# Patient Record
Sex: FEMALE | Race: WHITE
[De-identification: names, ages, dates, MRNs, and addresses within clinical notes are randomized per-mention and may not be internally consistent; named-entity substitution may affect disease eponyms.]

---

## 2017-03-13 ENCOUNTER — HOSPITAL ENCOUNTER (EMERGENCY)
Dept: HOSPITAL 80 - FED | Age: 32
LOS: 1 days | Discharge: HOME | End: 2017-03-14
Payer: MEDICAID

## 2017-03-13 DIAGNOSIS — Z91.040: ICD-10-CM

## 2017-03-13 DIAGNOSIS — J45.901: Primary | ICD-10-CM

## 2017-03-13 DIAGNOSIS — J06.9: ICD-10-CM

## 2017-03-13 NOTE — EDPHY
H & P


Stated Complaint: FEVER SEVERE COUGH SINCE SAT NIGHT


Time Seen by Provider: 17 23:04


HPI/ROS: 





HPI


The patient presents with Cough for the last 3 days which is worse at night, 

though constant, dry without any sputum production, not associated with any 

rhinorrhea or sore throat.  She is not sure if she had a fever.  She denies any 

known sick contacts.  She does have a history of asthma and her albuterol as 

currently .  She does not feel it is helping her with her symptoms.  She 

denies any hemoptysis.





REVIEW OF SYSTEMS


Constitutional:  No fever, no chills.


Eyes:  No discharge.


ENT:  No sore throat.


Cardiovascular:  No chest pain, no palpitations.


Respiratory:   see HPI


Gastrointestinal:  No abdominal pain, no vomiting.


Genitourinary:  No hematuria.


Musculoskeletal:  No back pain.


Skin:  No rashes.


Neurological:  No headache.





PMHx: asthma





Soc Hx: nonsmoker








PHYSICAL


General Appearance: Alert, no distress


Eyes: Pupils equal and round no pallor or injection


ENT, Mouth: Mucous membranes moist


Respiratory: occasional dry cough, breathing comfortably,There are no 

retractions, lungs are clear to auscultation


Cardiovascular: Regular rate and rhythm 


Gastrointestinal: Abdomen is soft and non-tender, no masses, bowel sounds 

normal 


Neurological: A&O, moves all extremities


Skin: Warm and dry, no rashes


Musculoskeletal: Neck is supple non tender 


Extremities:  symmetrical, full range of motion 


Psychiatric: Patient is oriented X 3, there is no agitation 





Source: Patient


Exam Limitations: No limitations





- Personal History


Current Tetanus/Diphtheria Vaccine: Yes


Current Tetanus Diphtheria and Acellular Pertussis (TDAP): Yes


Tetanus Vaccine Date: <10 YRS





- Medical/Surgical History


Hx Asthma: Yes


Hx Chronic Respiratory Disease: No


Hx Diabetes: No


Hx Cardiac Disease: No


Hx Renal Disease: Yes


Hx Cirrhosis: No


Hx Alcoholism: No


Hx HIV/AIDS: No


Hx Splenectomy or Spleen Trauma: No


Other PMH: concussions, wrist surgery, elbow surgery, shoulder surgery, kidney 

stones, R knee sx, asthma, uti's, "horseshoe kidney"





- Social History


Smoking Status: Never smoked


Constitutional: 


 Initial Vital Signs











Temperature (C)  37.3 C   17 22:20


 


Heart Rate  112 H  17 22:20


 


Respiratory Rate  18   17 22:20


 


Blood Pressure  117/90 H  17 22:20


 


O2 Sat (%)  97   17 22:20








 











O2 Delivery Mode               Room Air














Allergies/Adverse Reactions: 


 





latex [Latex] Allergy (Verified 17 22:22)


 Other-Enter Comments


milk Allergy (Verified 17 22:22)


 








Home Medications: 














 Medication  Instructions  Recorded


 


Citalopram [CeleXA 20 MG] 20 mg PO HS 13


 


Ibuprofen [Motrin (*)] 800 mg PO Q6 #15 tab 14


 


Albuterol [Proventil] 17 gm IH Q2H PRN #2 aerosol 17


 


Hydrocodone/Acetaminophen 1 each PO 17





[Hydrocodon-Acetaminophen 5-325]  


 


Oxybutynin Chloride [Ditropan 5mg 5 mg PO 17





(RX)]  


 


Sulfamethox/Tmp 800/160 mg 1 tab PO 17





[Bactrim Ds]  


 


predniSONE [Prednisone] 40 mg PO DAILY #16 tablet 17














Medical Decision Making





- Diagnostics


Imaging: 





 chest x-ray two views shows no infiltrate, mild airways disease, reviewed by 

Dr. Patten images were reviewed by me on the PAC system.


Differential Diagnosis: 





  This is a 31-year-old female with asthma who presents with several days of 

cough with  possible subjective fever.  On exam, she is not hypoxic and 

generally well appearing.  She has received a DuoNeb prior to my assessment 

which has helped her symptoms.





Differential diagnosis includes viral URI, pneumonia, asthma exacerbation.





The patient was given a dose of prednisone, I feel she is likely suffering from 

a viral URI with superimposed asthma exacerbation which is mild.  I will refill 

her albuterol for her and I have advised her to follow up with her regular 

doctor in 2 days if she is not feeling better.  She is in agreement with this 

plan.





- Data Points


Medications Given: 


 








Discontinued Medications





Albuterol/Ipratropium (Duoneb)  3 ml IH EDNOW ONE


   Stop: 17 23:07


   Last Admin: 17 23:13 Dose:  3 ml


Prednisone (Prednisone)  60 mg PO EDNOW ONE


   Stop: 17 23:36


   Last Admin: 17 00:23 Dose:  60 mg








Departure





- Departure


Disposition: Home, Routine, Self-Care


Clinical Impression: 


 Exacerbation of asthma





Upper respiratory tract infection


Qualifiers:


 URI type: unspecified viral URI Qualified Code(s): J06.9 - Acute upper 

respiratory infection, unspecified





Condition: Good


Instructions:  Upper Respiratory Infection (ED)


Additional Instructions: 


Please take your albuterol every 2-4 hours as needed for cough and wheezing.  

You should take the prednisone for the next 4 days.  You should return to the 

emergency room if you're worse in any way.  Otherwise, please follow-up with 

your primary care doctor in 2 days.


Referrals: 


Tammie Ayoub MD [Primary Care Provider] - As per Instructions


Prescriptions: 


Albuterol [Proventil] 17 gm IH Q2H PRN #2 aerosol


 PRN Reason: cough


predniSONE [Prednisone] 40 mg PO DAILY #16 tablet

## 2017-03-14 VITALS
DIASTOLIC BLOOD PRESSURE: 87 MMHG | OXYGEN SATURATION: 94 % | HEART RATE: 82 BPM | TEMPERATURE: 98.8 F | RESPIRATION RATE: 16 BRPM | SYSTOLIC BLOOD PRESSURE: 116 MMHG

## 2017-12-15 ENCOUNTER — HOSPITAL ENCOUNTER (OUTPATIENT)
Dept: HOSPITAL 80 - GIMAGING | Age: 32
End: 2017-12-15
Attending: FAMILY MEDICINE
Payer: MEDICAID

## 2017-12-15 DIAGNOSIS — R05: Primary | ICD-10-CM

## 2018-02-02 ENCOUNTER — HOSPITAL ENCOUNTER (EMERGENCY)
Dept: HOSPITAL 80 - FED | Age: 33
End: 2018-02-02
Payer: MEDICAID

## 2018-02-02 VITALS
SYSTOLIC BLOOD PRESSURE: 88 MMHG | OXYGEN SATURATION: 82 % | DIASTOLIC BLOOD PRESSURE: 58 MMHG | RESPIRATION RATE: 35 BRPM | HEART RATE: 115 BPM

## 2018-02-02 DIAGNOSIS — I46.9: Primary | ICD-10-CM

## 2018-02-02 DIAGNOSIS — J45.909: ICD-10-CM

## 2018-02-02 LAB
INR PPP: 1.24 (ref 0.83–1.16)
PLATELET # BLD: 150 10^3/UL (ref 150–400)
PROTHROMBIN TIME: 15.8 SEC (ref 12–15)

## 2018-02-02 PROCEDURE — 0BH17EZ INSERTION OF ENDOTRACHEAL AIRWAY INTO TRACHEA, VIA NATURAL OR ARTIFICIAL OPENING: ICD-10-PCS | Performed by: EMERGENCY MEDICINE

## 2018-02-02 PROCEDURE — G0480 DRUG TEST DEF 1-7 CLASSES: HCPCS

## 2018-02-02 NOTE — EDPHY
H & P


Stated Complaint: Code


HPI/ROS: 





HPI





CHIEF COMPLAINT:  Cardiopulmonary arrest





HISTORY OF PRESENT ILLNESS:  This patient is a 32-year-old female, history of 

asthma, presents to the emergency room by EMS as a cardiopulmonary arrest, with 

initial ROSC.  EMS was called to her residence for shortness of breath, nausea, 

and vomiting and diarrhea when they arrived they found her on the floor 

vomiting and she was complaining of shortness of breath. They put her on the 

stretcher and started a DuoNeb and transported her to the truck to place her on 

CPAP as she appeared to be in significant respiratory distress. When 

transporting her into the ambulance she apparently went to an agonal respiration

, went from a tachycardic rhythm down into a bradycardic rhythm and lost her 

pulse.  


They immediately started CPR.  They gave 1 mg epinephrine, also gave her 1 mg 

Narcan.  They transported the patient emergently to [facility] 

Emergency room, to ER room 1, where I greeted her upon arrival.


When she arrived to the emergency room she was in a sinus tachycardia rhythm, 

she was being bagged, however shortly after arrival she Miguelangel down into the 30s 

lost her pulse and we started CPR again.


She was intubated emergently upon arrival by myself.





After 35 min of ACLS protocol CPR, multiple rounds of epinephrine, multiple 

rounds of bicarb, and multiple rounds of calcium the patient had no return of 

spontaneous circulation she was in PEA throughout the rest of the 35 mins.  She 

had dilated 8 mm equal pupils.  During the 35 minutes of resuscitation, her 

mother did show up.  After 35 min of ACLS protocol, this was unfortunately 

unsuccessful, she did not return to spontaneous circulation and the decision 

was made to no longer continue CPR and resuscitation efforts.  A bedside 

ultrasound was performed.  There was no cardiac activity seen.





Past Medical History:  Anxiety, asthma





Past Surgical History:  Unknown surgical history





Social History:  Unknown drugs alcohol or tobacco





Family History:  Unknown








ROS   


REVIEW OF SYSTEMS:


 limited due the patient's clinical state.








Exam   


Constitutional  unresponsive. 


Eyes  8 mm dilated equal pupils not reactive to light


HENT   atraumatic exam


Respiratory   bagged, clear breath sounds with bagging


Cardiovascular   PEA rhythm no pulses.


Gastrointestinal   distended abdomen


Musculoskeletal  no obvious signs of trauma.


Skin   cool extremities, pale skin


Neurologic  unresponsive





Differential Diagnosis:  Includes but is not limited to in a particular order 

cardiopulmonary arrest, respiratory distress leading to cardiopulmonary arrest, 

electrolyte disturbance, sepsis, acute MI, other cardiac arrhythmia





Medical Decision Making:  Plan for this patient she is immediately greeted upon 

arrival to the emergency room where she was emergently intubated by myself with 

glide scope a 7.0 endotracheal tube was directly placed through the cords.  

Upon arrival the patient Miguelangel down into a PA bradycardic rhythm.  Patient 

immediately had CPR and multiple rounds of ACLS protocol.  Patient was given 

multiple rounds of epinephrine, bicarb, calcium.  She had 35 min of 

resuscitative efforts here in the emergency room at approximately 10-15 minutes 

of resuscitative efforts prior to arriving.


Unfortunately the patient did not return to spontaneous circulation.





The mom arrived we made a decision to no longer pursue resuscitative efforts.





Critical Care:  Total Critical Care Time Spent Managing this Patient: 35Minutes.


This time was spent Exclusively with this patient.


This Care was exclusive of procedures.


The Organ System/life at risk was cardiopulmonary arrest, respiratory


 This Patient was in Critical Condition because cardiopulmonary arrest





ED intubation Procedure:


Upon arrival to ER room 2.  The patient was emergently intubated by myself.  

Direct view of the cords were visualized by the glide scope.  A 7.0 

endotracheal tube was placed.  I saw the tube go directly through the cords.  

There is humidified return air in the tube.  Good capnography for change.  

Bilateral breath sounds.





35 min of ACLS protocol with multiple rounds of epinephrine, bicarb, calcium 

and multiple rounds of continuous CPR was performed.  This was unsuccessful.


Patient  time of death 0543AM.  Mother updated at bedside.

















        











Source: Patient, EMS





- Personal History


Current Tetanus/Diphtheria Vaccine: Unsure


Current Tetanus Diphtheria and Acellular Pertussis (TDAP): Unsure


Tetanus Vaccine Date: <10 YRS





- Medical/Surgical History


Hx Asthma: Yes


Hx Chronic Respiratory Disease: No


Hx Diabetes: No


Hx Cardiac Disease: No


Hx Renal Disease: Yes


Hx Cirrhosis: No


Hx Alcoholism: No


Hx HIV/AIDS: No


Hx Splenectomy or Spleen Trauma: No


Other PMH: concussions, wrist surgery, elbow surgery, shoulder surgery, kidney 

stones, R knee sx, asthma, uti's, "horseshoe kidney"





- Social History


Smoking Status: Never smoked


Constitutional: 


 Initial Vital Signs











Heart Rate  70   18 05:10


 


Respiratory Rate  10 L  18 05:10


 


O2 Sat (%)  78 L  18 05:10








 











O2 Delivery Mode               Bag Valve Mask














Allergies/Adverse Reactions: 


 





latex [Latex] Allergy (Verified 18 05:48)


 Other-Enter Comments


milk Allergy (Verified 18 05:48)


 








Home Medications: 














 Medication  Instructions  Recorded


 


Citalopram [CeleXA 20 MG] 20 mg PO HS 13


 


Ibuprofen [Motrin (*)] 800 mg PO Q6 #15 tab 14


 


Albuterol [Proventil] 17 gm IH Q2H PRN #2 aerosol 17


 


Hydrocodone/Acetaminophen 1 each PO 17





[Hydrocodon-Acetaminophen 5-325]  


 


Oxybutynin Chloride [Ditropan 5mg 5 mg PO 17





(RX)]  


 


Sulfamethox/Tmp 800/160 mg 1 tab PO 17





[Bactrim Ds]  


 


predniSONE [Prednisone] 40 mg PO DAILY #16 tablet 17














Medical Decision Making





- Data Points


Laboratory Results: 


 Laboratory Results





 18 05:21 





 18 05:21 





 











  18





  05:21 05:21 05:21


 


WBC      





    


 


RBC      





    


 


Hgb      





    


 


POC Hgb      





    


 


Hct      





    


 


POC Hct      





    


 


MCV      





    


 


MCH      





    


 


MCHC      





    


 


RDW      





    


 


Plt Count      





    


 


MPV      





    


 


Neut % (Auto)      





    


 


Lymph % (Auto)      





    


 


Mono % (Auto)      





    


 


Eos % (Auto)      





    


 


Baso % (Auto)      





    


 


Nucleat RBC Rel Count      





    


 


Absolute Neuts (auto)      





    


 


Absolute Lymphs (auto)      





    


 


Absolute Monos (auto)      





    


 


Absolute Eos (auto)      





    


 


Absolute Basos (auto)      





    


 


Absolute Nucleated RBC      





    


 


Immature Gran %      





    


 


Immature Gran #      





    


 


PT      15.8 SEC H SEC





     (12.0-15.0) 


 


INR      1.24  H 





     (0.83-1.16) 


 


APTT      23.1 SEC SEC





     (23.0-38.0) 


 


POC Sodium      





    


 


Sodium    147 mEq/L H mEq/L  





    (135-145)  


 


POC Potassium      





    


 


Potassium    3.5 mEq/L mEq/L  





    (3.5-5.2)  


 


POC Chloride      





    


 


Chloride    105 mEq/L mEq/L  





    ()  


 


Carbon Dioxide    12 mEq/l L mEq/l  





    (22-31)  


 


Anion Gap    30 mEq/L H mEq/L  





    (8-16)  


 


POC BUN      





    


 


BUN    10 mg/dL mg/dL  





    (7-23)  


 


Creatinine    1.4 mg/dL H mg/dL  





    (0.6-1.0)  


 


POC Creatinine      





    


 


Estimated GFR    44   





    


 


Glucose    285 mg/dL H mg/dL  





    ()  


 


POC Glucose      





    


 


Calcium    9.0 mg/dL mg/dL  





    (8.5-10.4)  


 


Troponin I    0.057 ng/mL H ng/mL  





    (0.000-0.034)  


 


Beta HCG, Qual  NEGATIVE     





    


 


Ethyl Alcohol    < 10 mg/dL mg/dL  





    (0-10)  














  18





  05:21 05:14


 


WBC  17.32 10^3/uL H 10^3/uL  





   (3.80-9.50)  


 


RBC  4.06 10^6/uL L 10^6/uL  





   (4.18-5.33)  


 


Hgb  13.0 g/dL g/dL  





   (12.6-16.3)  


 


POC Hgb    14.3 gm/dL gm/dL





    (12.6-16.3) 


 


Hct  42.5 % %  





   (38.0-47.0)  


 


POC Hct    42 % %





    (38-47) 


 


MCV  104.7 fL H fL  





   (81.5-99.8)  


 


MCH  32.0 pg pg  





   (27.9-34.1)  


 


MCHC  30.6 g/dL L g/dL  





   (32.4-36.7)  


 


RDW  13.2 % %  





   (11.5-15.2)  


 


Plt Count  150 10^3/uL 10^3/uL  





   (150-400)  


 


MPV  10.9 fL fL  





   (8.7-11.7)  


 


Neut % (Auto)  33.0 % L %  





   (39.3-74.2)  


 


Lymph % (Auto)  56.3 % H %  





   (15.0-45.0)  


 


Mono % (Auto)  8.5 % %  





   (4.5-13.0)  


 


Eos % (Auto)  0.8 % %  





   (0.6-7.6)  


 


Baso % (Auto)  0.4 % %  





   (0.3-1.7)  


 


Nucleat RBC Rel Count  0.2 % %  





   (0.0-0.2)  


 


Absolute Neuts (auto)  5.72 10^3/uL 10^3/uL  





   (1.70-6.50)  


 


Absolute Lymphs (auto)  9.75 10^3/uL H 10^3/uL  





   (1.00-3.00)  


 


Absolute Monos (auto)  1.47 10^3/uL H 10^3/uL  





   (0.30-0.80)  


 


Absolute Eos (auto)  0.14 10^3/uL 10^3/uL  





   (0.03-0.40)  


 


Absolute Basos (auto)  0.07 10^3/uL 10^3/uL  





   (0.02-0.10)  


 


Absolute Nucleated RBC  0.04 10^3/uL H 10^3/uL  





   (0-0.01)  


 


Immature Gran %  1.0 % %  





   (0.0-1.1)  


 


Immature Gran #  0.17 10^3/uL H 10^3/uL  





   (0.00-0.10)  


 


PT    





   


 


INR    





   


 


APTT    





   


 


POC Sodium    145 mEq/L mEq/L





    (135-145) 


 


Sodium    





   


 


POC Potassium    3.2 mEq/L L mEq/L





    (3.3-5.0) 


 


Potassium    





   


 


POC Chloride    105 mEq/L mEq/L





    () 


 


Chloride    





   


 


Carbon Dioxide    





   


 


Anion Gap    





   


 


POC BUN    11 mg/dL mg/dL





    (7-23) 


 


BUN    





   


 


Creatinine    





   


 


POC Creatinine    1.4 mg/dL H mg/dL





    (0.6-1.0) 


 


Estimated GFR    





   


 


Glucose    





   


 


POC Glucose    285 mg/dL H mg/dL





    () 


 


Calcium    





   


 


Troponin I    





   


 


Beta HCG, Qual    





   


 


Ethyl Alcohol    





   











Medications Given: 


 








Discontinued Medications





Atropine Sulfate (Atropine 1 Mg/10 Ml Syringe)  1 mg IVP EDNOW ONE


   Stop: 18 05:15


   Last Admin: 18 05:14 Dose:  1 mg


Calcium Chloride (Calcium Chloride)  1 gm IV EDNOW ONE


   Stop: 18 05:18


   Last Admin: 18 05:17 Dose:  1 gm


Calcium Chloride (Calcium Chloride)  1 gm IV EDNOW ONE


   Stop: 18 05:26


   Last Admin: 18 05:25 Dose:  1 gm


Calcium Chloride (Calcium Chloride)  1 gm IV EDNOW ONE


   Stop: 18 05:31


   Last Admin: 18 05:30 Dose:  1 gm


Epinephrine HCl (Epinephrine)  1 mg IVP EDNOW ONE


   Stop: 18 05:16


   Last Admin: 18 05:15 Dose:  1 mg


Epinephrine HCl (Epinephrine)  1 mg IVP EDNOW ONE


   Stop: 18 05:17


   Last Admin: 18 05:16 Dose:  1 mg


Epinephrine HCl (Epinephrine)  1 mg IVP EDNOW ONE


   Stop: 18 05:25


   Last Admin: 18 05:24 Dose:  1 mg


Epinephrine HCl (Epinephrine)  1 mg IVP EDNOW ONE


   Stop: 18 05:30


   Last Admin: 18 05:29 Dose:  1 mg


Epinephrine HCl (Epinephrine)  1 mg IVP EDNOW ONE


   Stop: 18 05:39


   Last Admin: 18 05:38 Dose:  1 mg


Sodium Bicarbonate (Sodium Bicarbonate)  50 meq IVP EDNOW ONE


   Stop: 18 05:17


   Last Admin: 18 05:16 Dose:  50 meq


Sodium Bicarbonate (Sodium Bicarbonate)  50 meq IVP EDNOW ONE


   Stop: 18 05:25


   Last Admin: 18 05:24 Dose:  50 meq


Sodium Bicarbonate (Sodium Bicarbonate)  50 meq IVP EDNOW ONE


   Stop: 18 05:33


   Last Admin: 18 05:32 Dose:  50 meq





Point of Care Test Results: 


 











  18





  05:14


 


POC Sodium  145


 


POC Potassium  3.2 L


 


POC Chloride  105


 


POC BUN  11


 


POC Creatinine  1.4 H


 


POC Glucose  285 H














Departure





- Departure


Disposition: 


Clinical Impression: 


 Cardiopulmonary arrest





Condition: Critical


Referrals: 


Patient,NotPresent [Primary Care Provider] - As per Instructions